# Patient Record
Sex: FEMALE | Race: WHITE | NOT HISPANIC OR LATINO | Employment: FULL TIME | URBAN - METROPOLITAN AREA
[De-identification: names, ages, dates, MRNs, and addresses within clinical notes are randomized per-mention and may not be internally consistent; named-entity substitution may affect disease eponyms.]

---

## 2018-01-29 ENCOUNTER — OFFICE VISIT (OUTPATIENT)
Dept: FAMILY MEDICINE CLINIC | Facility: CLINIC | Age: 23
End: 2018-01-29

## 2018-01-29 VITALS
TEMPERATURE: 98 F | HEART RATE: 91 BPM | DIASTOLIC BLOOD PRESSURE: 80 MMHG | OXYGEN SATURATION: 98 % | HEIGHT: 70 IN | WEIGHT: 116 LBS | BODY MASS INDEX: 16.61 KG/M2 | SYSTOLIC BLOOD PRESSURE: 118 MMHG

## 2018-01-29 DIAGNOSIS — N92.6 MENSTRUAL IRREGULARITY: ICD-10-CM

## 2018-01-29 DIAGNOSIS — Z00.00 ANNUAL PHYSICAL EXAM: Primary | ICD-10-CM

## 2018-01-29 DIAGNOSIS — Z72.51 RISKY SEXUAL BEHAVIOR: ICD-10-CM

## 2018-01-29 DIAGNOSIS — F43.0 STRESS REACTION: ICD-10-CM

## 2018-01-29 DIAGNOSIS — F43.10 PTSD (POST-TRAUMATIC STRESS DISORDER): ICD-10-CM

## 2018-01-29 DIAGNOSIS — Z00.00 HEALTH CARE MAINTENANCE: ICD-10-CM

## 2018-01-29 PROCEDURE — 99385 PREV VISIT NEW AGE 18-39: CPT | Performed by: FAMILY MEDICINE
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
## 2018-01-30 LAB
25(OH)D3+25(OH)D2 SERPL-MCNC: >60 NG/ML
ALBUMIN SERPL-MCNC: 4.9 G/DL (ref 3.5–5.2)
ALBUMIN/GLOB SERPL: 2.5 G/DL
ALP SERPL-CCNC: 37 U/L (ref 40–129)
ALT SERPL-CCNC: 14 U/L (ref 5–33)
AST SERPL-CCNC: 16 U/L (ref 5–32)
BASOPHILS # BLD AUTO: 0.04 10*3/MM3 (ref 0–0.2)
BASOPHILS NFR BLD AUTO: 0.7 % (ref 0–2)
BILIRUB SERPL-MCNC: 0.6 MG/DL (ref 0.2–1.2)
BUN SERPL-MCNC: 8 MG/DL (ref 6–20)
BUN/CREAT SERPL: 10.1 (ref 7–25)
CALCIUM SERPL-MCNC: 9.3 MG/DL (ref 8.6–10.5)
CHLORIDE SERPL-SCNC: 101 MMOL/L (ref 98–107)
CHOLEST SERPL-MCNC: 168 MG/DL (ref 0–200)
CO2 SERPL-SCNC: 25.9 MMOL/L (ref 22–29)
CREAT SERPL-MCNC: 0.79 MG/DL (ref 0.57–1)
EOSINOPHIL # BLD AUTO: 0.08 10*3/MM3 (ref 0.1–0.3)
EOSINOPHIL NFR BLD AUTO: 1.3 % (ref 0–4)
ERYTHROCYTE [DISTWIDTH] IN BLOOD BY AUTOMATED COUNT: 12.8 % (ref 11.5–14.5)
GFR SERPLBLD CREATININE-BSD FMLA CKD-EPI: 108 ML/MIN/1.73
GFR SERPLBLD CREATININE-BSD FMLA CKD-EPI: 89 ML/MIN/1.73
GLOBULIN SER CALC-MCNC: 2 GM/DL
GLUCOSE SERPL-MCNC: 83 MG/DL (ref 65–99)
HCT VFR BLD AUTO: 41.2 % (ref 37–47)
HDLC SERPL-MCNC: 63 MG/DL (ref 40–60)
HGB BLD-MCNC: 13.9 G/DL (ref 12–16)
HIV 1+2 AB+HIV1 P24 AG SERPL QL IA: NON REACTIVE
IMM GRANULOCYTES # BLD: 0.01 10*3/MM3 (ref 0–0.03)
IMM GRANULOCYTES NFR BLD: 0.2 % (ref 0–0.5)
LDLC SERPL CALC-MCNC: 92 MG/DL (ref 0–100)
LYMPHOCYTES # BLD AUTO: 2.23 10*3/MM3 (ref 0.6–4.8)
LYMPHOCYTES NFR BLD AUTO: 36.4 % (ref 20–45)
MCH RBC QN AUTO: 30 PG (ref 27–31)
MCHC RBC AUTO-ENTMCNC: 33.7 G/DL (ref 31–37)
MCV RBC AUTO: 88.8 FL (ref 81–99)
MONOCYTES # BLD AUTO: 0.59 10*3/MM3 (ref 0–1)
MONOCYTES NFR BLD AUTO: 9.6 % (ref 3–8)
NEUTROPHILS # BLD AUTO: 3.18 10*3/MM3 (ref 1.5–8.3)
NEUTROPHILS NFR BLD AUTO: 51.8 % (ref 45–70)
NRBC BLD AUTO-RTO: 0 /100 WBC (ref 0–0)
PLATELET # BLD AUTO: 169 10*3/MM3 (ref 140–500)
POTASSIUM SERPL-SCNC: 4.1 MMOL/L (ref 3.5–5.2)
PROT SERPL-MCNC: 6.9 G/DL (ref 6–8.5)
RBC # BLD AUTO: 4.64 10*6/MM3 (ref 4.2–5.4)
RPR SER QL: NORMAL
SODIUM SERPL-SCNC: 139 MMOL/L (ref 136–145)
TRIGL SERPL-MCNC: 66 MG/DL (ref 0–150)
TSH SERPL DL<=0.005 MIU/L-ACNC: 1.19 MIU/ML (ref 0.27–4.2)
VLDLC SERPL CALC-MCNC: 13.2 MG/DL (ref 7–27)
WBC # BLD AUTO: 6.13 10*3/MM3 (ref 4.8–10.8)

## 2018-10-04 ENCOUNTER — OFFICE VISIT (OUTPATIENT)
Dept: FAMILY MEDICINE CLINIC | Facility: CLINIC | Age: 23
End: 2018-10-04

## 2018-10-04 ENCOUNTER — HOSPITAL ENCOUNTER (OUTPATIENT)
Dept: GENERAL RADIOLOGY | Facility: HOSPITAL | Age: 23
Discharge: HOME | End: 2018-10-04
Admitting: NURSE PRACTITIONER

## 2018-10-04 VITALS
WEIGHT: 121 LBS | HEIGHT: 70 IN | DIASTOLIC BLOOD PRESSURE: 70 MMHG | OXYGEN SATURATION: 98 % | SYSTOLIC BLOOD PRESSURE: 110 MMHG | HEART RATE: 88 BPM | RESPIRATION RATE: 16 BRPM | BODY MASS INDEX: 17.32 KG/M2 | TEMPERATURE: 97.8 F

## 2018-10-04 DIAGNOSIS — M54.5 LOW BACK PAIN, UNSPECIFIED BACK PAIN LATERALITY, UNSPECIFIED CHRONICITY, WITH SCIATICA PRESENCE UNSPECIFIED: Primary | ICD-10-CM

## 2018-10-04 PROCEDURE — 99213 OFFICE O/P EST LOW 20 MIN: CPT | Performed by: NURSE PRACTITIONER

## 2018-10-04 PROCEDURE — 72100 X-RAY EXAM L-S SPINE 2/3 VWS: CPT

## 2018-10-04 PROCEDURE — 96372 THER/PROPH/DIAG INJ SC/IM: CPT | Performed by: NURSE PRACTITIONER

## 2018-10-04 RX ORDER — BACLOFEN 10 MG/1
10 TABLET ORAL 3 TIMES DAILY PRN
Qty: 30 TABLET | Refills: 0 | Status: SHIPPED | OUTPATIENT
Start: 2018-10-04

## 2018-10-04 RX ORDER — IBUPROFEN 600 MG/1
600 TABLET ORAL EVERY 8 HOURS PRN
COMMUNITY
Start: 2018-10-04

## 2018-10-04 RX ORDER — METHYLPREDNISOLONE ACETATE 40 MG/ML
40 INJECTION, SUSPENSION INTRA-ARTICULAR; INTRALESIONAL; INTRAMUSCULAR; SOFT TISSUE ONCE
Status: COMPLETED | OUTPATIENT
Start: 2018-10-04 | End: 2018-10-04

## 2018-10-04 RX ORDER — METHYLPREDNISOLONE 4 MG/1
TABLET ORAL
Qty: 1 EACH | Refills: 0 | Status: SHIPPED | OUTPATIENT
Start: 2018-10-05

## 2018-10-04 RX ADMIN — METHYLPREDNISOLONE ACETATE 40 MG: 40 INJECTION, SUSPENSION INTRA-ARTICULAR; INTRALESIONAL; INTRAMUSCULAR; SOFT TISSUE at 16:19

## 2018-10-04 NOTE — PATIENT INSTRUCTIONS
Back Pain, Adult  Back pain is very common. The pain often gets better over time. The cause of back pain is usually not dangerous. Most people can learn to manage their back pain on their own.  Follow these instructions at home:  Watch your back pain for any changes. The following actions may help to lessen any pain you are feeling:  · Stay active. Start with short walks on flat ground if you can. Try to walk farther each day.  · Exercise regularly as told by your doctor. Exercise helps your back heal faster. It also helps avoid future injury by keeping your muscles strong and flexible.  · Do not sit, drive, or  one place for more than 30 minutes.  · Do not stay in bed. Resting more than 1-2 days can slow down your recovery.  · Be careful when you bend or lift an object. Use good form when lifting:  ? Bend at your knees.  ? Keep the object close to your body.  ? Do not twist.  · Sleep on a firm mattress. Lie on your side, and bend your knees. If you lie on your back, put a pillow under your knees.  · Take medicines only as told by your doctor.  · Put ice on the injured area.  ? Put ice in a plastic bag.  ? Place a towel between your skin and the bag.  ? Leave the ice on for 20 minutes, 2-3 times a day for the first 2-3 days. After that, you can switch between ice and heat packs.  · Avoid feeling anxious or stressed. Find good ways to deal with stress, such as exercise.  · Maintain a healthy weight. Extra weight puts stress on your back.    Contact a doctor if:  · You have pain that does not go away with rest or medicine.  · You have worsening pain that goes down into your legs or buttocks.  · You have pain that does not get better in one week.  · You have pain at night.  · You lose weight.  · You have a fever or chills.  Get help right away if:  · You cannot control when you poop (bowel movement) or pee (urinate).  · Your arms or legs feel weak.  · Your arms or legs lose feeling (numbness).  · You feel sick  to your stomach (nauseous) or throw up (vomit).  · You have belly (abdominal) pain.  · You feel like you may pass out (faint).  This information is not intended to replace advice given to you by your health care provider. Make sure you discuss any questions you have with your health care provider.  Document Released: 06/05/2009 Document Revised: 05/25/2017 Document Reviewed: 04/21/2015  [employer] Interactive Patient Education © 2018 [employer]

## 2018-10-04 NOTE — PROGRESS NOTES
"Subjective   Gwen Malagon is a 22 y.o. female who presents for evaluation of low back pain. The patient has had no prior back problems. Symptoms have been present for 10 days and are gradually worsening.  Onset was related to / precipitated by lifting a heavy object. The pain is located in the across the lower back and does not radiate. The pain is described as sharp and occurs intermittently and in the morning. She rates her pain as moderate. Symptoms are exacerbated by deep breathing, lifting and lying down. Symptoms are improved by acetaminophen, heat, NSAIDs and rest. She has also tried nothing which provided no symptom relief. She has no other symptoms associated with the back pain. The patient has no \"red flag\" history indicative of complicated back pain.    The following portions of the patient's history were reviewed and updated as appropriate: past family history, past social history, past surgical history and problem list.    Review of Systems  A comprehensive review of systems was negative except for: Musculoskeletal: positive for back pain    Objective   Inspection and palpation: inspection of back is normal, sacroiliac tenderness noted across back.  Straight leg raise: positive at 45 degrees bilaterally.    Assessment/Plan   Nonspecific acute low back pain    Educational material distributed.  Proper lifting, bending technique discussed.  Heat to affected area as needed for local pain relief.  NSAIDs per medication orders.  Muscle relaxants per medication orders.  Follow-up in 1 week.if no improvement.      Shahla Brizuela, APRN         "

## 2019-10-31 ENCOUNTER — OFFICE VISIT (OUTPATIENT)
Dept: OBSTETRICS AND GYNECOLOGY | Age: 24
End: 2019-10-31

## 2019-10-31 VITALS
SYSTOLIC BLOOD PRESSURE: 118 MMHG | DIASTOLIC BLOOD PRESSURE: 76 MMHG | WEIGHT: 129.8 LBS | BODY MASS INDEX: 18.58 KG/M2 | HEIGHT: 70 IN

## 2019-10-31 DIAGNOSIS — Z12.4 SCREENING FOR MALIGNANT NEOPLASM OF CERVIX: ICD-10-CM

## 2019-10-31 DIAGNOSIS — Z30.018 ENCOUNTER FOR INITIAL PRESCRIPTION OF OTHER CONTRACEPTIVES: Primary | ICD-10-CM

## 2019-10-31 PROCEDURE — 99385 PREV VISIT NEW AGE 18-39: CPT | Performed by: OBSTETRICS & GYNECOLOGY

## 2019-10-31 RX ORDER — LORAZEPAM 1 MG/1
1 TABLET ORAL 2 TIMES DAILY
Qty: 1 TABLET | Refills: 0 | Status: SHIPPED | OUTPATIENT
Start: 2019-10-31

## 2019-10-31 RX ORDER — MULTIPLE VITAMINS W/ MINERALS TAB 9MG-400MCG
1 TAB ORAL DAILY
COMMUNITY

## 2019-10-31 NOTE — PROGRESS NOTES
Routine Annual Visit    10/31/2019    Patient: Gwen Malagon          MR#:[id number]      Chief Complaint   Patient presents with   • Gynecologic Exam     New pt. annual. needs nexplanon removed, . last pap 3 yrs ago per pt       History of Present Illness    23 y.o. female  who presents for annual exam.   Had irregular bleeding with nexplanon and worse with intercourse.  She is interested in using something else and the nexplanon just recently . Is using backup with condoms.  Does have a history of dysmenorrhea and would like something to help with that as well.  She denies any body image issues, she is a healthy diet and does exercise regularly.  She works as an     Health Maintenance  Gardasil yes  Last pap 3 yrs ago  Mammogram NA  Colonoscopy NA  PCP Dr. Gonzalez    Patient's last menstrual period was 2019 (within days).  Obstetric History:  OB History      Para Term  AB Living    0 0 0 0 0 0    SAB TAB Ectopic Molar Multiple Live Births    0 0 0 0 0 0         Menstrual History:     Patient's last menstrual period was 2019 (within days).       ________________________________________  Patient Active Problem List   Diagnosis   • PTSD (post-traumatic stress disorder)   • Menstrual irregularity       History reviewed. No pertinent past medical history.    Family History   Problem Relation Age of Onset   • Hypertension Mother    • Depression Mother    • Cancer Mother         leukemia   • Diabetes Father        History reviewed. No pertinent surgical history.    Social History     Tobacco Use   Smoking Status Never Smoker   Smokeless Tobacco Never Used       has a current medication list which includes the following prescription(s): etonogestrel, multivitamin with minerals, baclofen, ibuprofen, and methylprednisolone.  ________________________________________    Current contraception: nexplanon  History of abnormal Pap smear: no  Family history of Breast,  "ovarian, uterine, colon, pancreatic cancer: no  History of abnormal mammogram: NA    The following portions of the patient's history were reviewed and updated as appropriate: allergies, current medications, past family history, past medical history, past social history, past surgical history and problem list.    Review of Systems   Constitutional: Negative for fever and unexpected weight change.   Respiratory: Negative for shortness of breath.    Cardiovascular: Negative for chest pain.   Gastrointestinal: Negative for abdominal pain, constipation and diarrhea.   Genitourinary: Negative for frequency and urgency.   Hematological: Negative for adenopathy.   Psychiatric/Behavioral: Negative for dysphoric mood.       Objective   Physical Exam    /76   Ht 177.8 cm (70\")   Wt 58.9 kg (129 lb 12.8 oz)   LMP 09/02/2019 (Within Days)   Breastfeeding? No   BMI 18.62 kg/m²    BP Readings from Last 3 Encounters:   10/31/19 118/76   10/04/18 110/70   01/29/18 118/80      Wt Readings from Last 3 Encounters:   10/31/19 58.9 kg (129 lb 12.8 oz)   10/04/18 54.9 kg (121 lb)   01/29/18 52.6 kg (116 lb)         BMI: Body mass index is 18.62 kg/m².       General:   alert, appears stated age and cooperative   Heart:: regular rate and rhythm, S1, S2 normal, no murmur, click, rub or gallop   Lungs: normal respiratory effort and auscultation   Abdomen: soft, non-tender, without masses or organomegaly   Breast: inspection negative, no nipple discharge or bleeding, no masses or nodularity palpable   Urethra and bladder: urethral meatus normal; bladder nontender to palpation;   Vulva: normal, Bartholin's, Urethra, [place]'s normal, ingrown hair left thigh crease   Vagina: normal mucosa, normal discharge   Cervix: no lesions and nulliparous appearance   Uterus: normal size, anteverted or size seems adequate for IUD   Adnexa: normal adnexa and no mass, fullness, tenderness       Assessment:    normal annual exam   Desire for " IUD  nexplanon in place    Plan:    Plan     []  Mammogram request made  [x]  PAP done  []  Labs:   []  GC/Chl/TV  []  DEXA scan   []  Referral for colonoscopy     I reviewed all forms of contraception with her and she desires to have her Nexplanon removed and a Kyleena placed.  I reviewed the risks, benefits, alternatives and efficacy of various contraceptive methods.  She is very anxious about Nexplanon removal and also the IUD insertion, one-time dose of Ativan was sent to the pharmacy today with instructions to be taken an hour prior to next appointment and to have someone drive her    Counseling  Safe sex practices, contraception, abstinence from substance abuse, encourage self breast exam      Giovanna Poe MD  10/31/2019  3:40 PM.

## 2019-11-01 ENCOUNTER — TELEPHONE (OUTPATIENT)
Dept: OBSTETRICS AND GYNECOLOGY | Age: 24
End: 2019-11-01

## 2019-11-01 NOTE — TELEPHONE ENCOUNTER
"(Deepika pt) Pharmacy is calling because we called in a prescription for lorazepam \"Take one pill twice daily\" and only prescribed one quantity. Can we fix this?   "

## 2019-11-01 NOTE — TELEPHONE ENCOUNTER
Yes the direction just needs to be changed to take one pill once prior to procedure    Giovanna Poe MD  11/1/2019  4:18 PM

## 2019-11-04 LAB
CONV .: NORMAL
CYTOLOGIST CVX/VAG CYTO: NORMAL
CYTOLOGY CVX/VAG DOC CYTO: NORMAL
CYTOLOGY CVX/VAG DOC THIN PREP: NORMAL
DX ICD CODE: NORMAL
HIV 1 & 2 AB SER-IMP: NORMAL
OTHER STN SPEC: NORMAL
STAT OF ADQ CVX/VAG CYTO-IMP: NORMAL

## 2019-11-05 ENCOUNTER — TELEPHONE (OUTPATIENT)
Dept: OBSTETRICS AND GYNECOLOGY | Age: 24
End: 2019-11-05

## 2019-11-05 NOTE — TELEPHONE ENCOUNTER
----- Message from Giovanna Poe MD sent at 11/4/2019  5:20 PM EST -----  Please let her know that her Pap is normal    ----- Message -----  From: Interface, Reflab Results In  Sent: 11/4/2019  12:36 PM  To: Giovanna Poe MD